# Patient Record
Sex: FEMALE | Race: BLACK OR AFRICAN AMERICAN | NOT HISPANIC OR LATINO | Employment: OTHER | URBAN - METROPOLITAN AREA
[De-identification: names, ages, dates, MRNs, and addresses within clinical notes are randomized per-mention and may not be internally consistent; named-entity substitution may affect disease eponyms.]

---

## 2017-01-05 ENCOUNTER — TELEPHONE (OUTPATIENT)
Dept: TRANSPLANT | Facility: CLINIC | Age: 77
End: 2017-01-05

## 2017-01-05 LAB
EXT ALBUMIN: 4
EXT ALT: 13
EXT AST: 17
EXT BUN: 50
EXT CALCIUM: 9.6
EXT CHLORIDE: 97
EXT CREATININE: 1.46 MG/DL
EXT GLUCOSE: 95
EXT HEMATOCRIT: 43
EXT HEMOGLOBIN: 13.7
EXT MAGNESIUM: 1.8
EXT PLATELETS: 218
EXT POTASSIUM: 4.3
EXT PROTEIN TOTAL: 7.1
EXT SODIUM: 137 MMOL/L
EXT WBC: 8.8

## 2017-01-06 ENCOUNTER — TELEPHONE (OUTPATIENT)
Dept: TRANSPLANT | Facility: CLINIC | Age: 77
End: 2017-01-06

## 2017-01-06 NOTE — TELEPHONE ENCOUNTER
"Contacted patient and instructed to decrease her potassium intake to 40 mEq in the morning and 40 mEQ at night, for now, per Dr. Knapp. Also, informed patient that her potassium level was normal at this time and advised patient to call her PCP about the cramping in her hands. Patient states that she "feels pretty good today."    "

## 2017-01-09 ENCOUNTER — TELEPHONE (OUTPATIENT)
Dept: TRANSPLANT | Facility: CLINIC | Age: 77
End: 2017-01-09

## 2017-01-09 NOTE — TELEPHONE ENCOUNTER
Received a call from patient's niece (Yokasta) letting us know that Mrs. Rojas refused to take the new dose of Adempas because of side effects. She said that patient stated that she has read the brochure and she will not take the medication. Yokasta (niece) asked if it was ok to stay at 1 mg, if patient was willing to continue at that dose. I told her yes as 1 mg is better than none. Yokasta said she thinks her aunt's breathing has been better and she is happy that she has stayed out of the hospital.   Notified Dr. Knapp who is ok with patient staying at lower, more tolerable dose.  Also spoke with [facility] who is going to talk to the patient about the Adempas today and see if she will continue the medication.

## 2017-01-10 ENCOUNTER — TELEPHONE (OUTPATIENT)
Dept: FAMILY MEDICINE | Facility: CLINIC | Age: 77
End: 2017-01-10

## 2017-01-10 ENCOUNTER — OFFICE VISIT (OUTPATIENT)
Dept: FAMILY MEDICINE | Facility: CLINIC | Age: 77
End: 2017-01-10
Payer: MEDICARE

## 2017-01-10 ENCOUNTER — TELEPHONE (OUTPATIENT)
Dept: TRANSPLANT | Facility: CLINIC | Age: 77
End: 2017-01-10

## 2017-01-10 VITALS
DIASTOLIC BLOOD PRESSURE: 57 MMHG | HEART RATE: 100 BPM | HEIGHT: 67 IN | WEIGHT: 202 LBS | SYSTOLIC BLOOD PRESSURE: 85 MMHG | BODY MASS INDEX: 31.71 KG/M2 | TEMPERATURE: 98 F | OXYGEN SATURATION: 89 %

## 2017-01-10 DIAGNOSIS — M17.0 PRIMARY OSTEOARTHRITIS OF BOTH KNEES: ICD-10-CM

## 2017-01-10 DIAGNOSIS — I15.2 HYPERTENSION ASSOCIATED WITH DIABETES: ICD-10-CM

## 2017-01-10 DIAGNOSIS — E78.5 DYSLIPIDEMIA ASSOCIATED WITH TYPE 2 DIABETES MELLITUS: ICD-10-CM

## 2017-01-10 DIAGNOSIS — I27.20 PULMONARY HYPERTENSION: Chronic | ICD-10-CM

## 2017-01-10 DIAGNOSIS — E11.22 TYPE 2 DIABETES MELLITUS WITH CHRONIC KIDNEY DISEASE, WITHOUT LONG-TERM CURRENT USE OF INSULIN, UNSPECIFIED CKD STAGE: Chronic | ICD-10-CM

## 2017-01-10 DIAGNOSIS — G47.33 OSA (OBSTRUCTIVE SLEEP APNEA): ICD-10-CM

## 2017-01-10 DIAGNOSIS — E66.01 MORBID OBESITY DUE TO EXCESS CALORIES: ICD-10-CM

## 2017-01-10 DIAGNOSIS — I73.9 PVD (PERIPHERAL VASCULAR DISEASE): ICD-10-CM

## 2017-01-10 DIAGNOSIS — E11.69 DYSLIPIDEMIA ASSOCIATED WITH TYPE 2 DIABETES MELLITUS: ICD-10-CM

## 2017-01-10 DIAGNOSIS — E11.59 HYPERTENSION ASSOCIATED WITH DIABETES: ICD-10-CM

## 2017-01-10 DIAGNOSIS — R09.02 HYPOXIA: Primary | ICD-10-CM

## 2017-01-10 DIAGNOSIS — N18.30 CHRONIC KIDNEY DISEASE, STAGE III (MODERATE): ICD-10-CM

## 2017-01-10 DIAGNOSIS — J43.2 CENTRILOBULAR EMPHYSEMA: ICD-10-CM

## 2017-01-10 PROCEDURE — 1157F ADVNC CARE PLAN IN RCRD: CPT | Mod: S$GLB,,, | Performed by: FAMILY MEDICINE

## 2017-01-10 PROCEDURE — 1126F AMNT PAIN NOTED NONE PRSNT: CPT | Mod: S$GLB,,, | Performed by: FAMILY MEDICINE

## 2017-01-10 PROCEDURE — 1159F MED LIST DOCD IN RCRD: CPT | Mod: S$GLB,,, | Performed by: FAMILY MEDICINE

## 2017-01-10 PROCEDURE — 3074F SYST BP LT 130 MM HG: CPT | Mod: S$GLB,,, | Performed by: FAMILY MEDICINE

## 2017-01-10 PROCEDURE — 1160F RVW MEDS BY RX/DR IN RCRD: CPT | Mod: S$GLB,,, | Performed by: FAMILY MEDICINE

## 2017-01-10 PROCEDURE — 3078F DIAST BP <80 MM HG: CPT | Mod: S$GLB,,, | Performed by: FAMILY MEDICINE

## 2017-01-10 PROCEDURE — 99499 UNLISTED E&M SERVICE: CPT | Mod: S$PBB,,, | Performed by: FAMILY MEDICINE

## 2017-01-10 PROCEDURE — 99214 OFFICE O/P EST MOD 30 MIN: CPT | Mod: S$GLB,,, | Performed by: FAMILY MEDICINE

## 2017-01-10 PROCEDURE — 99999 PR PBB SHADOW E&M-EST. PATIENT-LVL III: CPT | Mod: PBBFAC,,, | Performed by: FAMILY MEDICINE

## 2017-01-10 NOTE — TELEPHONE ENCOUNTER
"Constance from interim  called to report that Mrs. Roajs is refusing to take Adempas (has not taken it since Saturday). Patient has also refused weekly labs and told the nurse, "don't come back her with a needle." Additionally, Mrs. Rojas told the nurse that her doctor today said that her "kidney's were shot" and she stopped her lasix. Patient said that she was advised to go to a nephrologist and she is going to do that on Monday. Constance reports that patient's weight is stable (200lbs), her pulse ox is 96% and no evidence of fluid on the lungs.   Notified Dr. Knapp.    Called patient to discuss Adempas. Patient confirmed that she will not take the Adempas because she read about it and it has too many side effects. Patient could not say that she had experienced these side effects. Patient also stated that had just come from the bathroom and was short of breath, so she does not thing it was helping anyway. Patient also mentioned that she had seen her doctor today and that he had told her to go to a nephrologist for her kidneys and to stop taking lasix. Let patient know that Dr. Knapp, her heart failure doctor, has been monitoring her diuretics and labs. Patient was agreeable to coming to see Dr. Knapp this Friday and transportation was arranged by her niece, Yokasta.  Patient did allow labs to be drawn today by  nurse.  "

## 2017-01-10 NOTE — TELEPHONE ENCOUNTER
----- Message from Carole Mejia sent at 1/10/2017 11:42 AM CST -----  Contact: ms liu patient's niece [phone]  Ms. Liu states patient was seen today and she would like to speak with you asap

## 2017-01-10 NOTE — TELEPHONE ENCOUNTER
Returned patients call, I spoke with Yokasta.They went to the place for the oxygen. She was told that the paperwork had to be submitted by the doctor to [facility] or it will not be approved. She also stated the patient started to experience severe pain under her eye followed by weakness. The pain has subsided but the patient is still slightly weak. Please advise.

## 2017-01-10 NOTE — MR AVS SNAPSHOT
[facility]  [address]  [place] [address]  Phone: [phone]  Fax: [phone]                  Tahira Rojas   1/10/2017 10:20 AM   Office Visit    Description:  Female : 1940   Provider:  Esteban Washburn MD   Department:  [facility]           Reason for Visit     Follow-up           Diagnoses this Visit        Comments    Hypoxia    -  Primary     LANI (obstructive sleep apnea)         Centrilobular emphysema         Pulmonary hypertension         Hypertension associated with diabetes         PVD (peripheral vascular disease)         Chronic kidney disease, stage III (moderate)         Dyslipidemia associated with type 2 diabetes mellitus         Type 2 diabetes mellitus with chronic kidney disease, without long-term current use of insulin, unspecified CKD stage         Morbid obesity due to excess calories         Primary osteoarthritis of both knees                To Do List           Future Appointments        Provider Department Dept Phone    2017 7:10 AM APPOINTMENT LAB, [place] [facility] [phone]    2017 10:20 AM Esteban Washburn MD [facility] [phone]    3/3/2017 9:00 AM LAB, APPOINTMENT [place] [facility] [phone]    3/3/2017 10:00 AM Luli Knapp MD [facility] [phone]      Goals (5 Years of Data)     None      Follow-Up and Disposition     Return in about 4 weeks (around 2017), or if symptoms worsen or fail to improve.      [facility]     [facility] -  Assistance  Registered nurses in the [facility] provide clinical advisement, health education, appointment booking, and other advisory services.  Call for this free service at [phone].             Medications           Message regarding Medications     Verify the changes and/or additions to your medication regime listed below are  the same as discussed with your clinician today.  If any of these changes or additions are incorrect, please notify your healthcare provider.             Verify that the below list of medications is an accurate representation of the medications you are currently taking.  If none reported, the list may be blank. If incorrect, please contact your healthcare provider. Carry this list with you in case of emergency.           Current Medications     acetaminophen (TYLENOL) 500 MG tablet Take 2 tablets (1,000 mg total) by mouth every 8 (eight) hours as needed for Pain.    albuterol-ipratropium 2.5mg-0.5mg/3mL (DUO-NEB) 0.5 mg-3 mg(2.5 mg base)/3 mL nebulizer solution USE 1 VIAL VIA NEBULIZER EVERY 6 HOURS AS NEEDED FOR WHEEZING    aspirin 81 MG Chew Take 81 mg by mouth once daily.     atorvastatin (LIPITOR) 80 MG tablet TAKE 1 TABLET BY MOUTH EVERY DAY    bumetanide (BUMEX) 1 MG tablet Take 3 tablets (3 mg total) by mouth 2 (two) times daily.    colchicine 0.6 mg tablet Take 1 tablet (0.6 mg total) by mouth as directed.    diclofenac sodium (VOLTAREN) 1 % Gel Apply 2 g topically 2 (two) times daily. Apply to knees    fluocinonide (LIDEX) 0.05 % external solution     fluticasone (FLONASE) 50 mcg/actuation nasal spray SHAKE WELL AND USE 2 SPRAYS IN EACH NOSTRIL EVERY DAY    furosemide (LASIX) 40 MG tablet 40 mg 2 (two) times daily.     guaifenesin (MUCINEX) 600 mg 12 hr tablet Take 1 tablet (600 mg total) by mouth 2 (two) times daily as needed for Congestion.    levothyroxine (SYNTHROID) 88 MCG tablet Take 1 tablet (88 mcg total) by mouth before breakfast.    metOLazone (ZAROXOLYN) 5 MG tablet Take 1 tablet (5 mg total) by mouth once daily.    multivitamin capsule Take 1 capsule by mouth once daily.    pantoprazole (PROTONIX) 40 MG tablet Take 1 tablet (40 mg total) by mouth once daily.    potassium chloride SA (K-DUR,KLOR-CON) 20 MEQ tablet Take 4 tabs (80 meq) in AM and 3 tabs (60meq) in PM    riociguat (ADEMPAS) 0.5 mg  "Tab tablet Take 1 tablet (0.5 mg total) by mouth 3 (three) times daily.    SPIRIVA WITH HANDIHALER 18 mcg inhalation capsule     spironolactone (ALDACTONE) 25 MG tablet Take 1 tablet (25 mg total) by mouth once daily.    tramadol (ULTRAM) 50 mg tablet Take 1 tablet (50 mg total) by mouth every 6 (six) hours as needed for Pain.    zolpidem (AMBIEN) 5 MG Tab Take 1 tablet (5 mg total) by mouth nightly as needed.    albuterol 90 mcg/actuation inhaler Inhale 2 puffs into the lungs every 6 (six) hours as needed for Wheezing or Shortness of Breath.           Clinical Reference Information           Vital Signs - Last Recorded  Most recent update: 1/10/2017 10:37 AM by Shila Fuentes LPN    BP Pulse Temp Ht Wt LMP    (!) 85/57 100 97.7 °F (36.5 °C) (Axillary) 5' 7" (1.702 m) 91.6 kg (202 lb) (LMP Unknown)    SpO2 BMI             (!) 85% 31.64 kg/m2         Blood Pressure          Most Recent Value    BP  (!)  85/57      Allergies as of 1/10/2017     No Known Allergies      Immunizations Administered on Date of Encounter - 1/10/2017     None      Orders Placed During Today's Visit      Normal Orders This Visit    OXYGEN FOR HOME USE       "

## 2017-01-10 NOTE — TELEPHONE ENCOUNTER
----- Message from Demetrice Zheng sent at 1/10/2017  1:19 PM CST -----  Contact: Sarah/Dr. Kim [phone]  Please call Sarah in regards to the pt

## 2017-01-10 NOTE — PROGRESS NOTES
Subjective:       Patient ID: Tahira Rojas is a 76 y.o. female.    Chief Complaint: Follow-up    HPI Comments: 76 yr old black female with obesity, CKD III, hypertension, hyperlipidemia, controlled diabetes mellitus II, peripheral vascular disease, DJD, OA, h/o mini strokes, presents today for her routine f/u and also c/o side effects from new medication for pulmonary HTN and also requesting new oxygen machine.    Hospital course: She presented to the ED with ADHF at [facility] 11/22/16 . She stayed for about 18 days and discharged on 12/07 and had IV diuresis. Her stay was uneventful.    Cough/chronic/COPD - recurrent - 3-4 weeks duration - former smoker and associated with SOB on exertion - oxygen in clinic is low which improved with deep breathing and then fluctuating. O2 sat 88 on room air and worse on exertion    CKD III - diagnosed last year - already referred to nephrology - has not seen one yet - on diuretics however diabetes and HTN controlled    HTN - controlled and actually low - she reports that it is mostly controlled at home as well - she is on 3-4 diuretics and her kidney functions are declining - compliant - no side effects    HLD - controlled - on statin -   LDLCALC                  76.4                11/21/2015                  DM II - controlled - HGBA1C                   6.5 (H)             08/18/2016                         -diet control -  up to date with foot and eye screen - on ACE and ASA    PVD/pulmonary HTN/DD - follows vascular medicine with transplant cardiology - had carotid ultrasound and has 70% blockage in right ICA       Health maintenance  -up to date    Medication Refill   This is a chronic problem. The current episode started more than 1 year ago. The problem occurs constantly. The problem has been gradually improving. Associated symptoms include arthralgias, coughing and myalgias. Pertinent negatives include no chest pain, chills, congestion, diaphoresis,  fatigue, headaches, joint swelling, nausea, neck pain, numbness, sore throat, visual change or weakness. Nothing aggravates the symptoms. Treatments tried: as below. The treatment provided significant relief.   Hypertension   This is a chronic problem. The current episode started more than 1 year ago. The problem has been gradually improving since onset. The problem is uncontrolled. Associated symptoms include shortness of breath. Pertinent negatives include no anxiety, chest pain, headaches, neck pain, palpitations, peripheral edema or sweats. There are no associated agents to hypertension. Past treatments include diuretics and beta blockers. The current treatment provides mild improvement. There are no compliance problems.  Hypertensive end-organ damage includes CVA, heart failure and PVD. There is no history of angina, kidney disease, CAD/MI, left ventricular hypertrophy, renovascular disease, retinopathy or a thyroid problem. Identifiable causes of hypertension include hyperparathyroidism. There is no history of chronic renal disease, hypercortisolism or pheochromocytoma.   Hyperlipidemia   This is a chronic problem. The current episode started more than 1 year ago. The problem is controlled. Recent lipid tests were reviewed and are normal. Exacerbating diseases include obesity. She has no history of chronic renal disease, diabetes, hypothyroidism or liver disease. There are no known factors aggravating her hyperlipidemia. Associated symptoms include myalgias and shortness of breath. Pertinent negatives include no chest pain. Current antihyperlipidemic treatment includes statins. The current treatment provides moderate improvement of lipids. There are no compliance problems.  Risk factors for coronary artery disease include hypertension and post-menopausal.   Diabetes   She presents for her follow-up diabetic visit. She has type 2 diabetes mellitus. Her disease course has been stable. Pertinent negatives for  hypoglycemia include no confusion, dizziness, headaches, mood changes, nervousness/anxiousness, pallor, seizures, speech difficulty, sweats or tremors. Pertinent negatives for diabetes include no chest pain, no fatigue, no foot paresthesias, no foot ulcerations, no polydipsia, no polyuria, no visual change and no weakness. Pertinent negatives for hypoglycemia complications include no hospitalization, no nocturnal hypoglycemia, no required assistance and no required glucagon injection. Symptoms are stable. Diabetic complications include a CVA and PVD. Pertinent negatives for diabetic complications include no retinopathy. Risk factors for coronary artery disease include dyslipidemia, diabetes mellitus, hypertension, obesity and post-menopausal. Current diabetic treatment includes diet. She is compliant with treatment all of the time. She is following a generally healthy diet. When asked about meal planning, she reported none. She participates in exercise intermittently. An ACE inhibitor/angiotensin II receptor blocker is being taken. She does not see a podiatrist.Eye exam is current.   Cough   This is a recurrent problem. The current episode started 1 to 4 weeks ago. The problem has been unchanged. The problem occurs constantly. The cough is non-productive. Associated symptoms include myalgias and shortness of breath. Pertinent negatives include no chest pain, chills, headaches, rhinorrhea, sore throat, sweats or wheezing. Nothing aggravates the symptoms. She has tried nothing for the symptoms. The treatment provided no relief. Her past medical history is significant for bronchitis and environmental allergies. There is no history of asthma, bronchiectasis, COPD or emphysema.   Shortness of Breath   This is a recurrent problem. The current episode started more than 1 month ago. The problem occurs intermittently. The problem has been unchanged. Pertinent negatives include no chest pain, headaches, leg swelling, neck  pain, rhinorrhea, sore throat or wheezing. The symptoms are aggravated by exercise. Risk factors include smoking. She has tried nothing for the symptoms. The treatment provided no relief. Her past medical history is significant for allergies, chronic lung disease and a heart failure. There is no history of aspirin allergies, asthma, COPD, DVT or a recent surgery.     Review of Systems   Constitutional: Negative.  Negative for activity change, chills, diaphoresis, fatigue and unexpected weight change.   HENT: Negative.  Negative for congestion, ear discharge, hearing loss, rhinorrhea, sore throat and voice change.    Eyes: Negative.  Negative for pain, discharge and visual disturbance.   Respiratory: Positive for cough and shortness of breath. Negative for chest tightness and wheezing.    Cardiovascular: Negative.  Negative for chest pain, palpitations and leg swelling.   Gastrointestinal: Negative.  Negative for abdominal distention, anal bleeding, constipation and nausea.   Endocrine: Negative.  Negative for cold intolerance, polydipsia and polyuria.   Genitourinary: Negative.  Negative for decreased urine volume, difficulty urinating, dysuria, frequency, menstrual problem and vaginal pain.   Musculoskeletal: Positive for arthralgias and myalgias. Negative for gait problem, joint swelling and neck pain.   Skin: Negative.  Negative for color change, pallor and wound.   Allergic/Immunologic: Positive for environmental allergies. Negative for immunocompromised state.   Neurological: Negative.  Negative for dizziness, tremors, seizures, speech difficulty, weakness, numbness and headaches.   Hematological: Negative.  Negative for adenopathy. Does not bruise/bleed easily.   Psychiatric/Behavioral: Negative.  Negative for agitation, confusion, decreased concentration, hallucinations, self-injury and suicidal ideas. The patient is not nervous/anxious.        PMH/PSH/FH/SH/MED/ALLERGY reviewed    Objective:       Vitals:     01/10/17 1029   BP: (!) 85/57   Pulse: 100   Temp: 97.7 °F (36.5 °C)       Physical Exam   Constitutional: She is oriented to person, place, and time. She appears well-developed and well-nourished. No distress.   HENT:   Head: Normocephalic and atraumatic.   Right Ear: External ear normal.   Left Ear: External ear normal.   Nose: Nose normal.   Mouth/Throat: Oropharynx is clear and moist. No oropharyngeal exudate.   On oxygen via NC   Eyes: Conjunctivae and EOM are normal. Pupils are equal, round, and reactive to light. Right eye exhibits no discharge. Left eye exhibits no discharge. No scleral icterus.   Neck: Normal range of motion. Neck supple. No JVD present. No tracheal deviation present. No thyromegaly present.   Cardiovascular: Normal rate, regular rhythm, normal heart sounds and intact distal pulses.  Exam reveals no gallop and no friction rub.    No murmur heard.  Pulmonary/Chest: Effort normal and breath sounds normal. No stridor. She has no wheezes. She has no rales. She exhibits no tenderness.   Abdominal: Soft. Bowel sounds are normal. She exhibits no distension and no mass. There is no tenderness. There is no rebound and no guarding. No hernia.   Musculoskeletal: Normal range of motion. She exhibits tenderness (moderate TTP medial and lateral joint line left knee). She exhibits no edema.   Lymphadenopathy:     She has no cervical adenopathy.   Neurological: She is alert and oriented to person, place, and time. She has normal reflexes. She displays normal reflexes. No cranial nerve deficit. She exhibits normal muscle tone. Coordination normal.   Wheelchair bound but ambulated to chair by herself   Skin: Skin is warm and dry. No rash noted. She is not diaphoretic. No erythema. No pallor.   Psychiatric: She has a normal mood and affect. Her behavior is normal. Judgment and thought content normal.       Assessment:       1. Hypoxia    2. LANI (obstructive sleep apnea)    3. Centrilobular emphysema    4.  Pulmonary hypertension    5. Hypertension associated with diabetes    6. PVD (peripheral vascular disease)    7. Chronic kidney disease, stage III (moderate)    8. Dyslipidemia associated with type 2 diabetes mellitus    9. Type 2 diabetes mellitus with chronic kidney disease, without long-term current use of insulin, unspecified CKD stage    10. Morbid obesity due to excess calories    11. Primary osteoarthritis of both knees        Plan:       Tahira was seen today for follow-up.    Diagnoses and all orders for this visit:    Hypoxia  -     OXYGEN FOR HOME USE    LANI (obstructive sleep apnea)    Centrilobular emphysema  -     OXYGEN FOR HOME USE    Pulmonary hypertension  -     OXYGEN FOR HOME USE    Hypertension associated with diabetes    PVD (peripheral vascular disease)    Chronic kidney disease, stage III (moderate)    Dyslipidemia associated with type 2 diabetes mellitus    Type 2 diabetes mellitus with chronic kidney disease, without long-term current use of insulin, unspecified CKD stage    Morbid obesity due to excess calories    Primary osteoarthritis of both knees      Hypoxic with Pulm HTN and COPD  -gave ER precautions  -sats fluctuating - no changes in mental status     Chronic low back pain  -on pain med as needed    DM II  -controlled  -diet control    HTN  -controlled and actually low  -encouraged to visit nephrology for second opinion regarding diuretics  -hold LASIX    HLD  -controlled    Obesity  -healthy lifestyle measures discussed  -diet and exercise    pulm HTN/COPD  -on Home O2 - gave new rx      Chronic cough/bronchitis  -cough med when needed    Spent adequate time in obtaining history and explaining differentials    40 minutes spent during this visit of which greater than 50% devoted to face-face counseling and coordination of care regarding diagnosis and management plan    Return in about 4 weeks (around 2/7/2017), or if symptoms worsen or fail to improve.

## 2017-01-11 ENCOUNTER — TELEPHONE (OUTPATIENT)
Dept: FAMILY MEDICINE | Facility: CLINIC | Age: 77
End: 2017-01-11

## 2017-01-11 ENCOUNTER — TELEPHONE (OUTPATIENT)
Dept: TRANSPLANT | Facility: CLINIC | Age: 77
End: 2017-01-11

## 2017-01-11 LAB
EXT ALT: 13
EXT AST: 19
EXT BUN: 50
EXT CALCIUM: 9.7
EXT CHLORIDE: 88
EXT CREATININE: 1.75 MG/DL
EXT GLUCOSE: 143
EXT HEMATOCRIT: 44
EXT HEMOGLOBIN: 14.4
EXT MAGNESIUM: 1.8
EXT PLATELETS: 220
EXT POTASSIUM: 3.1
EXT PROTEIN TOTAL: 7.1
EXT SODIUM: 134 MMOL/L
EXT WBC: 9.4

## 2017-01-11 NOTE — TELEPHONE ENCOUNTER
----- Message from Demetrice Zheng sent at 1/11/2017  2:21 PM CST -----  Contact: [place]/[phone]  Please fax over most recent labs on the pt/  Fax [phone]

## 2017-01-12 LAB — BNP (B-TYPE NATRIURETIC PEP): 335

## 2017-01-13 ENCOUNTER — OFFICE VISIT (OUTPATIENT)
Dept: TRANSPLANT | Facility: CLINIC | Age: 77
End: 2017-01-13
Payer: MEDICARE

## 2017-01-13 VITALS
DIASTOLIC BLOOD PRESSURE: 59 MMHG | HEART RATE: 90 BPM | SYSTOLIC BLOOD PRESSURE: 110 MMHG | OXYGEN SATURATION: 86 % | WEIGHT: 205.25 LBS | BODY MASS INDEX: 32.21 KG/M2 | HEIGHT: 67 IN

## 2017-01-13 DIAGNOSIS — J43.2 CENTRILOBULAR EMPHYSEMA: ICD-10-CM

## 2017-01-13 DIAGNOSIS — I27.81 COR PULMONALE: ICD-10-CM

## 2017-01-13 DIAGNOSIS — R09.02 HYPOXIA: ICD-10-CM

## 2017-01-13 DIAGNOSIS — I10 ESSENTIAL HYPERTENSION: ICD-10-CM

## 2017-01-13 DIAGNOSIS — I27.20 PULMONARY HYPERTENSION: Primary | Chronic | ICD-10-CM

## 2017-01-13 DIAGNOSIS — H57.12 EYE PAIN, LEFT: ICD-10-CM

## 2017-01-13 DIAGNOSIS — N18.30 CHRONIC KIDNEY DISEASE, STAGE III (MODERATE): ICD-10-CM

## 2017-01-13 DIAGNOSIS — Z86.73 HX TIA/STROKE W/O RESID: ICD-10-CM

## 2017-01-13 DIAGNOSIS — J47.9 ADULT BRONCHIECTASIS: ICD-10-CM

## 2017-01-13 DIAGNOSIS — G47.33 OSA (OBSTRUCTIVE SLEEP APNEA): ICD-10-CM

## 2017-01-13 DIAGNOSIS — R53.81 DEBILITY: ICD-10-CM

## 2017-01-13 DIAGNOSIS — J96.11 CHRONIC RESPIRATORY FAILURE WITH HYPOXIA: ICD-10-CM

## 2017-01-13 DIAGNOSIS — R51.9 HEADACHE, UNSPECIFIED HEADACHE TYPE: ICD-10-CM

## 2017-01-13 PROCEDURE — 99999 PR PBB SHADOW E&M-EST. PATIENT-LVL III: CPT | Mod: PBBFAC,,, | Performed by: INTERNAL MEDICINE

## 2017-01-13 PROCEDURE — 3078F DIAST BP <80 MM HG: CPT | Mod: S$GLB,,, | Performed by: INTERNAL MEDICINE

## 2017-01-13 PROCEDURE — 1159F MED LIST DOCD IN RCRD: CPT | Mod: S$GLB,,, | Performed by: INTERNAL MEDICINE

## 2017-01-13 PROCEDURE — 1160F RVW MEDS BY RX/DR IN RCRD: CPT | Mod: S$GLB,,, | Performed by: INTERNAL MEDICINE

## 2017-01-13 PROCEDURE — 99499 UNLISTED E&M SERVICE: CPT | Mod: S$PBB,,, | Performed by: INTERNAL MEDICINE

## 2017-01-13 PROCEDURE — 99214 OFFICE O/P EST MOD 30 MIN: CPT | Mod: S$GLB,,, | Performed by: INTERNAL MEDICINE

## 2017-01-13 PROCEDURE — 3074F SYST BP LT 130 MM HG: CPT | Mod: S$GLB,,, | Performed by: INTERNAL MEDICINE

## 2017-01-13 PROCEDURE — 1157F ADVNC CARE PLAN IN RCRD: CPT | Mod: S$GLB,,, | Performed by: INTERNAL MEDICINE

## 2017-01-13 RX ORDER — METOLAZONE 5 MG/1
5 TABLET ORAL DAILY
Qty: 90 TABLET | Refills: 11
Start: 2017-01-13 | End: 2018-01-13

## 2017-01-13 NOTE — PATIENT INSTRUCTIONS
Check your medicines, if you see furosemide (lasix) is there, put it aside and do not take it.     Continue bumex, but lets try 2mg twice a day- if you continue to make a lot of urine, stay at 2mg twice a day. If you start to swell more and gain fluid weight, go back to the 3mg twice a day.    Check your weights every morning after getting out of bed and urinating. If your weight goes up 3# overnight or 5# in one week call us and we'll tell you whether to take the metolazone.    [phone]- heart failure nurses    Keep salt intake to under 2000 mg sodium, fluids to under 2 L (64 oz)    Call us if you find yourself getting more short of breath, have more swelling or unexpected weight changes

## 2017-01-13 NOTE — PROGRESS NOTES
Subjective:    Patient ID:  Tahira Rojas is a 76 y.o. female who presents for follow-up of Pulmonary Hypertension.    Congestive Heart Failure   Associated symptoms include coughing. Pertinent negatives include no abdominal pain, change in bowel habit, chest pain, chills or fever.      76 y.o. 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 She was initially seen in our PH clinic but recently hospitalized and moved to HF. She has been intolerant to PH therapies in the past (Tyvaso, Letairis, Adcirca). At her last visit,  was set up to give her IV Lasix 80 mg weekly. Unfortunately she was admitted again 11/22 for hypervolemia. She was diuresed with a Lasix gtt and Adempas 0.5 mg po tid was started.     Since last visit, Pt says her feet are swollen as she has been sleeping with her feet down, not comfortable laying back. She is afraid to take the adempas, fears the SE and says she knows her body cant tolerate it- she never even tried it.   bumex is 3mg bid, she also has lasix on her med list which I am worried she was also taking. Says her breathing is about the same, her cough is gone. reports good UOP with her diuretics, says she stays in the BR.     Echo 11/23:    CONCLUSIONS     1 - Normal left ventricular systolic function (EF 55-60%).     2 - Right ventricular enlargement with low normal systolic function.     3 - Normal left ventricular diastolic function.     4 - Pulmonary hypertension. The estimated PA systolic pressure is 96 mmHg.     5 - Right atrial enlargement.     6 - Moderate tricuspid regurgitation.     7 - Increased central venous pressure.       RHC June 2016:    AOPRES: 133/80 (98)  AOSAT: 95  FICKCI: 1.95  FICKCO: 4.13  PAPRES: 87/32 (51)  PASAT: 57  PVR: 9.68  PWPRES: 14/13 (15)  RAPRES: 17/16 (13)  RVPRES: 83/2, 15      Outside labs 1/10/17  BUN 87  Cr 1.75  BNP  "335  K 3.1    Review of Systems   Constitution: Negative for chills, fever, malaise/fatigue and weight gain.   HENT: Negative.    Eyes: Negative.    Cardiovascular: Positive for dyspnea on exertion and leg swelling. Negative for chest pain, near-syncope, orthopnea, palpitations, paroxysmal nocturnal dyspnea and syncope.   Respiratory: Positive for cough and sleep disturbances due to breathing. Negative for shortness of breath.    Endocrine: Negative.    Skin: Negative.    Musculoskeletal: Negative.    Gastrointestinal: Positive for bloating. Negative for abdominal pain and change in bowel habit.   Neurological: Positive for light-headedness. Negative for dizziness.   Psychiatric/Behavioral: Negative for depression.        Objective:    Visit Vitals    BP (!) 110/59    Pulse 90    Ht 5' 7" (1.702 m)    Wt 93.1 kg (205 lb 4 oz)    LMP  (LMP Unknown)    SpO2 (!) 86%  Comment: Pt is on 4l of O2 at time of reading    BMI 32.15 kg/m2         Physical Exam   Constitutional: She is oriented to person, place, and time. She appears well-developed and well-nourished.   HENT:   Head: Normocephalic and atraumatic.   Eyes: Right eye exhibits no discharge. Left eye exhibits no discharge.   Neck: Neck supple. JVD present. No thyromegaly present.   Cardiovascular: Normal rate and regular rhythm.  Exam reveals no gallop and no friction rub.    Murmur heard.  Soft HSM LLSB and apex     Pulmonary/Chest: Effort normal and breath sounds normal. No respiratory distress. She has no wheezes. She has no rales.   Abdominal: Soft. Bowel sounds are normal. She exhibits distension. There is no tenderness.   obese   Musculoskeletal: Normal range of motion. She exhibits edema. She exhibits no tenderness.   1+ pitting edema retirement to knees   Neurological: She is alert and oriented to person, place, and time. No cranial nerve deficit. Coordination normal.   Skin: Skin is warm and dry. No rash noted.   Psychiatric: She has a normal mood and " affect. Judgment and thought content normal.           Chemistry        Component Value Date/Time     12/07/2016 0439    K 3.8 12/07/2016 0439    CL 92 (L) 12/07/2016 0439    CO2 32 (H) 12/07/2016 0439    BUN 51 (H) 12/07/2016 0439    CREATININE 1.3 12/07/2016 0439     (H) 12/07/2016 0439        Component Value Date/Time    CALCIUM 10.0 12/07/2016 0439    ALKPHOS 144 (H) 11/22/2016 1058    AST 25 11/22/2016 1058    ALT 22 11/22/2016 1058    BILITOT 1.4 (H) 11/22/2016 1058            Magnesium   Date Value Ref Range Status   12/07/2016 2.2 1.6 - 2.6 mg/dL Final       Lab Results   Component Value Date    WBC 6.80 12/07/2016    HGB 14.0 12/07/2016    HCT 41.8 12/07/2016    MCV 80 (L) 12/07/2016     12/07/2016       Lab Results   Component Value Date    INR 1.2 11/23/2016    INR 1.2 10/12/2016    INR 1.3 (H) 08/17/2016       BNP   Date Value Ref Range Status   11/27/2016 793 (H) 0 - 99 pg/mL Final     Comment:     Values of less than 100 pg/ml are consistent with non-CHF populations.   11/22/2016 844 (H) 0 - 99 pg/mL Final     Comment:     Values of less than 100 pg/ml are consistent with non-CHF populations.   11/14/2016 950 (H) 0 - 99 pg/mL Final     Comment:     Values of less than 100 pg/ml are consistent with non-CHF populations.       No results found for: LDH          Assessment:       1. PH- with elevated BNP though improved c/w when she was hospitalized, mild volume overload on exam, WHO FC IV (syncope/near syncope)- interestingly, left-sided filling pressures were NORMAL on RHC, PH was severe with marginal CO/CI and increased RAP- was recently hospitalized with ADHF, still has some volume overload on exam but will need to balance her CKD with her CHF. She is not willing to even try a pulmonary VD due to concerns of SE.   2. Chronic respiratory failure    3. Chronic diastolic heart failure    4. Dyslipidemia associated with type 2 diabetes mellitus    5. Essential hypertension    6.  Hyperlipidemia    7. Acute pulmonary edema    8. Dyspnea, unspecified dyspnea    9. Gingival hyperplasia   10 Morbid obesity, unspecified obesity type           Plan:       Will try bumex 2mg bid to spare her kidneys- if she starts retaining again may need to go back to  3mg bid    Has apt with nephrology on Monday- asked her to double check her meds at  Home and put lasix aside if she is still actively taking it. No longer takes metolazone, told her we would instruct her when to take it if its needed in the future.    Neurology referral for eye pain with h/o TIA (saw ophtho already and exam was unremarkable)    F/u in 3 months with labs or sooner prn    Keep salt intake to under 2000 mg sodium, fluids to under 2 L (64 oz)    Daily weights. If  weight goes up 3# overnight or 5# in one week she should call us- may then instruct her to add metolazone    Compression stockings during the day, elevate feet at night    Move to HF

## 2017-01-13 NOTE — MR AVS SNAPSHOT
[facility]  [address]  [facility] [address]  Phone: [phone]                  Tahira Rojas   2017 3:00 PM   Office Visit    Description:  Female : 1940   Provider:  Luli Knapp MD   Department:  [facility]           Reason for Visit     Pulmonary Hypertension           Diagnoses this Visit        Comments    Pulmonary hypertension    -  Primary     Essential hypertension         Hypoxia         Centrilobular emphysema         LANI (obstructive sleep apnea)         Cor pulmonale         Debility         Chronic kidney disease, stage III (moderate)         Chronic respiratory failure with hypoxia         Adult bronchiectasis         Eye pain, left         Headache, unspecified headache type         Hx TIA/stroke w/o resid                To Do List           Future Appointments        Provider Department Dept Phone    2/10/2017 10:20 AM Esteban Washburn MD [facility] [phone]    2017 7:10 AM APPOINTMENT LAB, [facility] [phone]    2017 10:20 AM Esteban Washburn MD [facility] [phone]      Goals (5 Years of Data)     None      Follow-Up and Disposition     Return in about 3 months (around 2017).       These Medications        Disp Refills Start End    metOLazone (ZAROXOLYN) 5 MG tablet 90 tablet 11 2017    Take 1 tablet (5 mg total) by mouth once daily. Patient is to take this only when instructed by a provider - Oral    Pharmacy: [facility] [address] [place] [address] [facility] Ph #: [phone]         [facility]     [facility] -  Assistance  Registered nurses in the [facility] provide clinical advisement, health education, appointment booking, and other advisory services.  Call for this free service at [phone].             Medications            Message regarding Medications     Verify the changes and/or additions to your medication regime listed below are the same as discussed with your clinician today.  If any of these changes or additions are incorrect, please notify your healthcare provider.        CHANGE how you are taking these medications     Start Taking Instead of    metOLazone (ZAROXOLYN) 5 MG tablet metOLazone (ZAROXOLYN) 5 MG tablet    Dosage:  Take 1 tablet (5 mg total) by mouth once daily. Patient is to take this only when instructed by a provider Dosage:  Take 1 tablet (5 mg total) by mouth once daily.    Reason for Change:  Reorder       STOP taking these medications     furosemide (LASIX) 40 MG tablet 40 mg 2 (two) times daily.            Verify that the below list of medications is an accurate representation of the medications you are currently taking.  If none reported, the list may be blank. If incorrect, please contact your healthcare provider. Carry this list with you in case of emergency.           Current Medications     acetaminophen (TYLENOL) 500 MG tablet Take 2 tablets (1,000 mg total) by mouth every 8 (eight) hours as needed for Pain.    albuterol 90 mcg/actuation inhaler Inhale 2 puffs into the lungs every 6 (six) hours as needed for Wheezing or Shortness of Breath.    albuterol-ipratropium 2.5mg-0.5mg/3mL (DUO-NEB) 0.5 mg-3 mg(2.5 mg base)/3 mL nebulizer solution USE 1 VIAL VIA NEBULIZER EVERY 6 HOURS AS NEEDED FOR WHEEZING    aspirin 81 MG Chew Take 81 mg by mouth once daily.     atorvastatin (LIPITOR) 80 MG tablet TAKE 1 TABLET BY MOUTH EVERY DAY    bumetanide (BUMEX) 1 MG tablet Take 3 tablets (3 mg total) by mouth 2 (two) times daily.    colchicine 0.6 mg tablet Take 1 tablet (0.6 mg total) by mouth as directed.    diclofenac sodium (VOLTAREN) 1 % Gel Apply 2 g topically 2 (two) times daily. Apply to knees    fluocinonide (LIDEX) 0.05 % external solution     fluticasone (FLONASE) 50 mcg/actuation nasal spray SHAKE WELL  "AND USE 2 SPRAYS IN EACH NOSTRIL EVERY DAY    guaifenesin (MUCINEX) 600 mg 12 hr tablet Take 1 tablet (600 mg total) by mouth 2 (two) times daily as needed for Congestion.    levothyroxine (SYNTHROID) 88 MCG tablet Take 1 tablet (88 mcg total) by mouth before breakfast.    metOLazone (ZAROXOLYN) 5 MG tablet Take 1 tablet (5 mg total) by mouth once daily. Patient is to take this only when instructed by a provider    multivitamin capsule Take 1 capsule by mouth once daily.    pantoprazole (PROTONIX) 40 MG tablet Take 1 tablet (40 mg total) by mouth once daily.    potassium chloride SA (K-DUR,KLOR-CON) 20 MEQ tablet Take 4 tabs (80 meq) in AM and 3 tabs (60meq) in PM    SPIRIVA WITH HANDIHALER 18 mcg inhalation capsule     spironolactone (ALDACTONE) 25 MG tablet Take 1 tablet (25 mg total) by mouth once daily.    tramadol (ULTRAM) 50 mg tablet Take 1 tablet (50 mg total) by mouth every 6 (six) hours as needed for Pain.    zolpidem (AMBIEN) 5 MG Tab Take 1 tablet (5 mg total) by mouth nightly as needed.    riociguat (ADEMPAS) 0.5 mg Tab tablet Take 1 tablet (0.5 mg total) by mouth 3 (three) times daily.           Clinical Reference Information           Vital Signs - Last Recorded  Most recent update: 1/13/2017  3:17 PM by Vamsi Catalan MA    BP Pulse Ht Wt LMP SpO2    (!) 110/59 90 5' 7" (1.702 m) 93.1 kg (205 lb 4 oz) (LMP Unknown) (!) 86%    BMI                32.15 kg/m2          Blood Pressure          Most Recent Value    BP  (!)  110/59      Allergies as of 1/13/2017     No Known Allergies      Immunizations Administered on Date of Encounter - 1/13/2017     None      Orders Placed During Today's Visit      Normal Orders This Visit    Ambulatory consult to Neurology       Instructions    Check your medicines, if you see furosemide (lasix) is there, put it aside and do not take it.     Continue bumex, but lets try 2mg twice a day- if you continue to make a lot of urine, stay at 2mg twice a day. If you start to swell " more and gain fluid weight, go back to the 3mg twice a day.    Check your weights every morning after getting out of bed and urinating. If your weight goes up 3# overnight or 5# in one week call us and we'll tell you whether to take the metolazone.    [phone]- heart failure nurses    Keep salt intake to under 2000 mg sodium, fluids to under 2 L (64 oz)    Call us if you find yourself getting more short of breath, have more swelling or unexpected weight changes

## 2017-01-24 ENCOUNTER — TELEPHONE (OUTPATIENT)
Dept: TRANSPLANT | Facility: CLINIC | Age: 77
End: 2017-01-24

## 2017-01-24 NOTE — TELEPHONE ENCOUNTER
Received a call from patient's niece letting us know that Mrs. Martinez passed away peacefully in her home on Saturday, 1/21/2017.  Dr. nKapp and staff notified.

## 2017-02-04 DIAGNOSIS — E78.5 DYSLIPIDEMIA ASSOCIATED WITH TYPE 2 DIABETES MELLITUS: ICD-10-CM

## 2017-02-04 DIAGNOSIS — E11.69 DYSLIPIDEMIA ASSOCIATED WITH TYPE 2 DIABETES MELLITUS: ICD-10-CM

## 2017-02-04 RX ORDER — ATORVASTATIN CALCIUM 80 MG/1
TABLET, FILM COATED ORAL
Qty: 90 TABLET | Refills: 0 | OUTPATIENT
Start: 2017-02-04